# Patient Record
Sex: FEMALE | URBAN - METROPOLITAN AREA
[De-identification: names, ages, dates, MRNs, and addresses within clinical notes are randomized per-mention and may not be internally consistent; named-entity substitution may affect disease eponyms.]

---

## 2022-12-20 ENCOUNTER — OFFICE VISIT (OUTPATIENT)
Dept: URBAN - METROPOLITAN AREA CLINIC 76 | Facility: CLINIC | Age: 72
End: 2022-12-20
Payer: COMMERCIAL

## 2022-12-20 DIAGNOSIS — H52.4 PRESBYOPIA: ICD-10-CM

## 2022-12-20 DIAGNOSIS — Z79.84 LONG TERM (CURRENT) USE OF ORAL HYPOGLYCEMIC DRUGS: ICD-10-CM

## 2022-12-20 DIAGNOSIS — H25.13 AGE-RELATED NUCLEAR CATARACT, BILATERAL: ICD-10-CM

## 2022-12-20 DIAGNOSIS — H02.889 MEIBOMIAN GLAND DYSFUNCTION OF EYE: ICD-10-CM

## 2022-12-20 DIAGNOSIS — E11.9 TYPE 2 DIABETES MELLITUS W/O COMPLICATION: Primary | ICD-10-CM

## 2022-12-20 PROCEDURE — 99203 OFFICE O/P NEW LOW 30 MIN: CPT | Performed by: OPTOMETRIST

## 2022-12-20 ASSESSMENT — INTRAOCULAR PRESSURE
OD: 12
OS: 12

## 2022-12-20 ASSESSMENT — VISUAL ACUITY
OS: 20/30
OD: 20/30

## 2022-12-20 ASSESSMENT — KERATOMETRY
OS: 44.50
OD: 44.25

## 2022-12-20 NOTE — IMPRESSION/PLAN
Impression: Type 2 diabetes mellitus w/o complication: S13.7 Bilateral. Plan: Discussed diagnosis in detail with patient. No treatment is required at this time. Emphasized blood sugar control. Call if [address] worsens. Will continue to observe condition and or symptoms, keep follow ups with primary care for glycemic management. Recommend yearly exams. Letter sent to PCP.